# Patient Record
Sex: FEMALE | Race: WHITE | NOT HISPANIC OR LATINO | URBAN - METROPOLITAN AREA
[De-identification: names, ages, dates, MRNs, and addresses within clinical notes are randomized per-mention and may not be internally consistent; named-entity substitution may affect disease eponyms.]

---

## 2020-07-25 ENCOUNTER — TELEPHONE ENCOUNTER (OUTPATIENT)
Dept: URBAN - METROPOLITAN AREA CLINIC 13 | Facility: CLINIC | Age: 45
End: 2020-07-25

## 2020-07-26 ENCOUNTER — TELEPHONE ENCOUNTER (OUTPATIENT)
Dept: URBAN - METROPOLITAN AREA CLINIC 13 | Facility: CLINIC | Age: 45
End: 2020-07-26

## 2020-07-26 RX ORDER — NORETHINDRONE ACETATE AND ETHINYL ESTRADIOL 1; 20 MG/1; UG/1
TAKE 1 TABLET DAILY TABLET ORAL
Refills: 0 | Status: ACTIVE | COMMUNITY

## 2020-07-26 RX ORDER — AMITRIPTYLINE HYDROCHLORIDE 10 MG/1
TAKE 1 TABLET DAILY AS NEEDED TABLET, FILM COATED ORAL
Refills: 0 | Status: ACTIVE | COMMUNITY

## 2021-03-10 ENCOUNTER — OFFICE VISIT (OUTPATIENT)
Dept: URBAN - METROPOLITAN AREA CLINIC 107 | Facility: CLINIC | Age: 46
End: 2021-03-10

## 2021-04-26 ENCOUNTER — OFFICE VISIT (OUTPATIENT)
Dept: URBAN - METROPOLITAN AREA CLINIC 107 | Facility: CLINIC | Age: 46
End: 2021-04-26

## 2021-05-04 ENCOUNTER — DASHBOARD ENCOUNTERS (OUTPATIENT)
Age: 46
End: 2021-05-04

## 2021-05-04 ENCOUNTER — WEB ENCOUNTER (OUTPATIENT)
Dept: URBAN - METROPOLITAN AREA CLINIC 113 | Facility: CLINIC | Age: 46
End: 2021-05-04

## 2021-05-04 ENCOUNTER — OFFICE VISIT (OUTPATIENT)
Dept: URBAN - METROPOLITAN AREA CLINIC 113 | Facility: CLINIC | Age: 46
End: 2021-05-04
Payer: COMMERCIAL

## 2021-05-04 VITALS
HEIGHT: 64 IN | HEART RATE: 100 BPM | RESPIRATION RATE: 18 BRPM | SYSTOLIC BLOOD PRESSURE: 123 MMHG | WEIGHT: 134 LBS | DIASTOLIC BLOOD PRESSURE: 76 MMHG | TEMPERATURE: 98 F | BODY MASS INDEX: 22.88 KG/M2

## 2021-05-04 DIAGNOSIS — R79.89 ELEVATED LFTS: ICD-10-CM

## 2021-05-04 DIAGNOSIS — D13.4 HEPATIC ADENOMA: ICD-10-CM

## 2021-05-04 PROBLEM — 424263008: Status: ACTIVE | Noted: 2021-05-04

## 2021-05-04 PROBLEM — 863927004: Status: ACTIVE | Noted: 2021-05-04

## 2021-05-04 PROCEDURE — 99204 OFFICE O/P NEW MOD 45 MIN: CPT | Performed by: INTERNAL MEDICINE

## 2021-05-04 PROCEDURE — 99244 OFF/OP CNSLTJ NEW/EST MOD 40: CPT | Performed by: INTERNAL MEDICINE

## 2021-05-04 RX ORDER — AMITRIPTYLINE HYDROCHLORIDE 10 MG/1
TAKE 1 TABLET DAILY AS NEEDED TABLET, FILM COATED ORAL
Refills: 0 | Status: DISCONTINUED | COMMUNITY

## 2021-05-04 RX ORDER — NORETHINDRONE ACETATE AND ETHINYL ESTRADIOL 1; 20 MG/1; UG/1
TAKE 1 TABLET DAILY TABLET ORAL
Refills: 0 | Status: DISCONTINUED | COMMUNITY

## 2021-05-04 NOTE — HPI-TODAY'S VISIT:
The patient was referred by Dr. Joy Montero for elevated LFTs .   A copy of this document is being forwarded to the referring provider.  Patient presents for initial evaluation.  She was recently found to have elevated LFTs.  She was unaware of this prior.  She denies any history of jaundice or dark urine.  No history of liver disease in herself or her family.  She denies any abdominal pain, nausea or vomiting.  Recent labs were reviewed from March demonstrated alkaline phosphatase 127,  and ALT of 125.  CBC was normal.  She had ultrasound followed by an MRI which revealed a 1 cm perihepatic adenoma.  She has now stopped her oral contraceptives that she was taking previously.  She does report drinking alcohol but has stopped since this finding.  She thinks she had alcohol the night before her last set of blood work.  No other precipitating factors.  No other ameliorating factors.  No new medications otherwise.

## 2021-05-12 LAB
A/G RATIO: 1.7
ACTIN (SMOOTH MUSCLE) ANTIBODY: 6
ALBUMIN: 4.5
ALKALINE PHOSPHATASE: 41
ALT (SGPT): 28
ANTI-CENTROMERE B ANTIBODIES: <0.2
ANTI-DNA (DS) AB QN: 4
ANTI-JO-1: <0.2
ANTI-MITOCHONDRIAL AB BY IFA: (no result)
ANTICHROMATIN ANTIBODIES: <0.2
ANTINUCLEAR ANTIBODIES, IFA: POSITIVE
ANTISCLERODERMA-70 ANTIBODIES: <0.2
AST (SGOT): 28
BASO (ABSOLUTE): 0.1
BASOS: 1
BILIRUBIN, TOTAL: 0.3
BUN/CREATININE RATIO: 12
BUN: 8
CALCIUM: 10.5
CARBON DIOXIDE, TOTAL: 21
CENTRIOLE PATTERN: (no result)
CENTROMERE PATTERN: (no result)
CERULOPLASMIN: 29.8
CHLORIDE: 102
CREATININE: 0.68
EGFR IF AFRICN AM: 121
EGFR IF NONAFRICN AM: 105
EOS (ABSOLUTE): 0.2
EOS: 3
FERRITIN, SERUM: 347
GLOBULIN, TOTAL: 2.6
GLUCOSE: 80
HBSAG SCREEN: NEGATIVE
HEMATOCRIT: 41
HEMATOLOGY COMMENTS:: (no result)
HEMOGLOBIN: 14
HEP A AB, IGM: NEGATIVE
HEP A AB, TOTAL: NEGATIVE
HEP B SURFACE AB, QUAL: NON REACTIVE
HEP C VIRUS AB: <0.1
HOMOGENEOUS PATTERN: (no result)
IMMATURE CELLS: (no result)
IMMATURE GRANS (ABS): 0
IMMATURE GRANULOCYTES: 0
LYMPHS (ABSOLUTE): 1.3
LYMPHS: 23
Lab: (no result)
MCH: 34.5
MCHC: 34.1
MCV: 101
MIDBODY PATTERN: (no result)
MONOCYTES(ABSOLUTE): 0.6
MONOCYTES: 11
NEUTROPHILS (ABSOLUTE): 3.6
NEUTROPHILS: 62
NRBC: (no result)
NUCLEAR DOT PATTERN: (no result)
NUCLEAR MEMBRANE PATTERN: (no result)
NUCLEOLAR PATTERN: (no result)
PCNA PATTERN: (no result)
PLATELETS: 276
POTASSIUM: 4.7
PROTEIN, TOTAL: 7.1
RBC: 4.06
RDW: 11.6
RNP ANTIBODIES: -0.2
SJOGREN'S ANTI-SS-A: <0.2
SJOGREN'S ANTI-SS-B: <0.2
SMITH ANTIBODIES: <0.2
SODIUM: 140
SPECKLED PATTERN: (no result)
SPINDLE APPARATUS PATTERN: (no result)
T-TRANSGLUTAMINASE (TTG) IGA: <2
T-TRANSGLUTAMINASE (TTG) IGG: <2
WBC: 5.7

## 2021-07-14 ENCOUNTER — OFFICE VISIT (OUTPATIENT)
Dept: URBAN - METROPOLITAN AREA CLINIC 113 | Facility: CLINIC | Age: 46
End: 2021-07-14